# Patient Record
Sex: MALE | Race: WHITE | NOT HISPANIC OR LATINO | URBAN - METROPOLITAN AREA
[De-identification: names, ages, dates, MRNs, and addresses within clinical notes are randomized per-mention and may not be internally consistent; named-entity substitution may affect disease eponyms.]

---

## 2023-08-19 ENCOUNTER — HOSPITAL ENCOUNTER (EMERGENCY)
Facility: OTHER | Age: 33
Discharge: HOME | End: 2023-08-19
Attending: EMERGENCY MEDICINE
Payer: MEDICAID

## 2023-08-19 VITALS
OXYGEN SATURATION: 100 % | RESPIRATION RATE: 17 BRPM | SYSTOLIC BLOOD PRESSURE: 162 MMHG | WEIGHT: 202 LBS | TEMPERATURE: 97 F | DIASTOLIC BLOOD PRESSURE: 85 MMHG | HEART RATE: 81 BPM | BODY MASS INDEX: 25.93 KG/M2 | HEIGHT: 74 IN

## 2023-08-19 DIAGNOSIS — R53.1 WEAKNESS GENERALIZED: ICD-10-CM

## 2023-08-19 DIAGNOSIS — E86.0 DEHYDRATION: Primary | ICD-10-CM

## 2023-08-19 PROCEDURE — 96361 HYDRATE IV INFUSION ADD-ON: CPT

## 2023-08-19 PROCEDURE — 99284 EMERGENCY DEPT VISIT MOD MDM: CPT | Mod: 25

## 2023-08-19 PROCEDURE — 87389 HIV-1 AG W/HIV-1&-2 AB AG IA: CPT | Performed by: EMERGENCY MEDICINE

## 2023-08-19 PROCEDURE — 93005 ELECTROCARDIOGRAM TRACING: CPT

## 2023-08-19 PROCEDURE — 36000 PLACE NEEDLE IN VEIN: CPT | Mod: 59

## 2023-08-19 PROCEDURE — 36415 COLL VENOUS BLD VENIPUNCTURE: CPT | Performed by: EMERGENCY MEDICINE

## 2023-08-19 PROCEDURE — 96360 HYDRATION IV INFUSION INIT: CPT

## 2023-08-19 PROCEDURE — 85025 COMPLETE CBC W/AUTO DIFF WBC: CPT | Performed by: EMERGENCY MEDICINE

## 2023-08-19 PROCEDURE — 93010 EKG 12-LEAD: ICD-10-PCS | Mod: ,,, | Performed by: INTERNAL MEDICINE

## 2023-08-19 PROCEDURE — 93010 ELECTROCARDIOGRAM REPORT: CPT | Mod: ,,, | Performed by: INTERNAL MEDICINE

## 2023-08-19 PROCEDURE — 86803 HEPATITIS C AB TEST: CPT | Performed by: EMERGENCY MEDICINE

## 2023-08-19 PROCEDURE — 80053 COMPREHEN METABOLIC PANEL: CPT | Performed by: EMERGENCY MEDICINE

## 2023-08-19 PROCEDURE — 25000003 PHARM REV CODE 250: Performed by: EMERGENCY MEDICINE

## 2023-08-19 RX ADMIN — SODIUM CHLORIDE 1000 ML: 9 INJECTION, SOLUTION INTRAVENOUS at 06:08

## 2023-08-19 NOTE — ED PROVIDER NOTES
"Encounter Date: 8/19/2023    SCRIBE #1 NOTE: I, Felecia Stone, am scribing for, and in the presence of,  Aleksander Tellez II, MD.       History     Chief Complaint   Patient presents with    Fatigue     Patient reports " I woke up on the floor, feeling bad - shaking, weakness, and headache". Patient sts "I still feel weak", ambulating independently, head pain 10/10, clear speech. Ran out of BP meds for approx. 1 month (unknown med)     Robbi Baker is a 32 y.o. male who presents to the ED with headache and fatigue. He states that he was sleeping in a chair and woke up on the floor, but he is unsure if he had a syncopal episode. He reports headache but denies pain elsewhere. He has not taken any medication for these symptoms. He admits to consuming alcoholic drinks yesterday. His PCP is [facility]abet[facility] Bill.     The history is provided by the patient.     Review of patient's allergies indicates:  No Known Allergies  No past medical history on file.  No past surgical history on file.  No family history on file.     Review of Systems  See HPI.     Physical Exam     Initial Vitals [08/19/23 0545]   BP Pulse Resp Temp SpO2   137/82 105 20 97.4 °F (36.3 °C) 98 %      MAP       --         Physical Exam    Nursing note and vitals reviewed.  Constitutional: He appears well-developed and well-nourished. He is not diaphoretic. No distress.   Comfortable appearing.    HENT:   Head: Normocephalic and atraumatic.   Dry mucous membranes.    Eyes: Conjunctivae and EOM are normal. Pupils are equal, round, and reactive to light.   Pupils are 4 mm. No photosensitivity.    Neck: Neck supple.   Normal range of motion.  Cardiovascular:  Normal rate, regular rhythm and intact distal pulses.     Exam reveals no gallop and no friction rub.       No murmur heard.  Pulmonary/Chest: Breath sounds normal. No respiratory distress. He has no wheezes. He has no rhonchi. He has no rales.   Abdominal: Abdomen is soft. There is no abdominal tenderness. "   Musculoskeletal:         General: No tenderness or edema. Normal range of motion.      Cervical back: Normal range of motion and neck supple.     Neurological: He is alert and oriented to person, place, and time.   Skin: Skin is warm and dry.   Psychiatric: He has a normal mood and affect. His behavior is normal. Judgment and thought content normal.         ED Course   Procedures  Labs Reviewed - No data to display    EKG Readings: (Independently Interpreted)   Sinus rhythm. Rate of 100. No ST or T wave changes. No ischemia. Normal QT interval.       Imaging Results    None          Medications   sodium chloride 0.9% bolus 1,000 mL 1,000 mL (0 mLs Intravenous Stopped 8/19/23 8481)     Medical Decision Making  Amount and/or Complexity of Data Reviewed  Labs: ordered.  ECG/medicine tests: ordered and independent interpretation performed.    Pt presents concerned because woke up on the floor.  Had gone asleep on the couch after night of heavy drinking.  Unsure if he fell or passed out.  On exam he has no trauma, no focal deficits.  Complains of mild headache and appears dehydrated but otherwise well-appearing.  EKG was unremarkable.  Laboratory studies performed during epic downtime show normal electrolytes and renal function.  After IV fluids, he is feeling better.  Tolerating p.o..  Encouraged continued hydration at home.  Understands return precautions.  Stable for discharge        Scribe Attestation:   Scribe #1: I performed the above scribed service and the documentation accurately describes the services I performed. I attest to the accuracy of the note.    Physician Attestation for Scribe: I, TL, reviewed documentation as scribed in my presence, which is both accurate and complete.                    Clinical Impression:   Final diagnoses:  [R53.1] Weakness generalized  [E86.0] Dehydration (Primary)        ED Disposition Condition    Discharge Stable          ED Prescriptions    None       Follow-up  Information       Follow up With Specialties Details Why Contact [facility]    Center, [facility] Internal [facility], Family Medicine In 5 days  [address] [facility] [id number]  [phone]               Aleksander Tellez II, MD  08/19/23 1245

## 2023-08-23 LAB
ALBUMIN SERPL BCP-MCNC: 3.9 G/DL (ref 3.5–5.2)
ALP SERPL-CCNC: 84 U/L (ref 55–135)
ALT SERPL W/O P-5'-P-CCNC: 17 U/L (ref 10–44)
ANION GAP SERPL CALC-SCNC: 10 MMOL/L (ref 8–16)
AST SERPL-CCNC: 21 U/L (ref 10–40)
BASOPHILS # BLD AUTO: 0.02 K/UL (ref 0–0.2)
BASOPHILS NFR BLD: 0.3 % (ref 0–1.9)
BILIRUB SERPL-MCNC: 0.4 MG/DL (ref 0.1–1)
BUN SERPL-MCNC: 9 MG/DL (ref 6–20)
CALCIUM SERPL-MCNC: 9 MG/DL (ref 8.7–10.5)
CHLORIDE SERPL-SCNC: 102 MMOL/L (ref 95–110)
CO2 SERPL-SCNC: 21 MMOL/L (ref 23–29)
CREAT SERPL-MCNC: 1.2 MG/DL (ref 0.5–1.4)
DIFFERENTIAL METHOD: ABNORMAL
EOSINOPHIL # BLD AUTO: 0 K/UL (ref 0–0.5)
EOSINOPHIL NFR BLD: 0.2 % (ref 0–8)
ERYTHROCYTE [DISTWIDTH] IN BLOOD BY AUTOMATED COUNT: 13.7 % (ref 11.5–14.5)
EST. GFR  (NO RACE VARIABLE): >60 ML/MIN/1.73 M^2
GLUCOSE SERPL-MCNC: 82 MG/DL (ref 70–110)
HCT VFR BLD AUTO: 38.6 % (ref 40–54)
HCV AB SERPL QL IA: NEGATIVE
HGB BLD-MCNC: 12.7 G/DL (ref 14–18)
HIV 1+2 AB+HIV1 P24 AG SERPL QL IA: POSITIVE
IMM GRANULOCYTES # BLD AUTO: 0.01 K/UL (ref 0–0.04)
IMM GRANULOCYTES NFR BLD AUTO: 0.2 % (ref 0–0.5)
LYMPHOCYTES # BLD AUTO: 1.9 K/UL (ref 1–4.8)
LYMPHOCYTES NFR BLD: 29.1 % (ref 18–48)
MCH RBC QN AUTO: 27.8 PG (ref 27–31)
MCHC RBC AUTO-ENTMCNC: 32.9 G/DL (ref 32–36)
MCV RBC AUTO: 85 FL (ref 82–98)
MONOCYTES # BLD AUTO: 1 K/UL (ref 0.3–1)
MONOCYTES NFR BLD: 15 % (ref 4–15)
NEUTROPHILS # BLD AUTO: 3.6 K/UL (ref 1.8–7.7)
NEUTROPHILS NFR BLD: 55.2 % (ref 38–73)
NRBC BLD-RTO: 0 /100 WBC
PLATELET # BLD AUTO: 240 K/UL (ref 150–450)
PMV BLD AUTO: 10.1 FL (ref 9.2–12.9)
POTASSIUM SERPL-SCNC: 3.4 MMOL/L (ref 3.5–5.1)
PROT SERPL-MCNC: 8 G/DL (ref 6–8.4)
RBC # BLD AUTO: 4.57 M/UL (ref 4.6–6.2)
SODIUM SERPL-SCNC: 133 MMOL/L (ref 136–145)
WBC # BLD AUTO: 6.46 K/UL (ref 3.9–12.7)

## 2023-08-23 RX ORDER — ESTRADIOL VALERATE 20 MG/ML
INJECTION INTRAMUSCULAR
COMMUNITY
Start: 2023-07-17

## 2023-08-23 RX ORDER — SPIRONOLACTONE 100 MG/1
100 TABLET, FILM COATED ORAL
COMMUNITY
Start: 2023-05-30

## 2023-08-23 RX ORDER — DARUNAVIR, COBICISTAT, EMTRICITABINE, AND TENOFOVIR ALAFENAMIDE 800; 150; 200; 10 MG/1; MG/1; MG/1; MG/1
1 TABLET, FILM COATED ORAL
COMMUNITY
Start: 2023-07-17

## 2023-08-25 LAB
HIV SUPPLEMENTAL ASSAY INTERPRETATION: ABNORMAL
HIV-1 RESULT: POSITIVE
HIV-2 RESULT: NEGATIVE